# Patient Record
Sex: FEMALE | Race: WHITE
[De-identification: names, ages, dates, MRNs, and addresses within clinical notes are randomized per-mention and may not be internally consistent; named-entity substitution may affect disease eponyms.]

---

## 2017-12-16 ENCOUNTER — HOSPITAL ENCOUNTER (EMERGENCY)
Dept: HOSPITAL 88 - ER | Age: 82
Discharge: HOME | End: 2017-12-16
Payer: MEDICARE

## 2017-12-16 VITALS — WEIGHT: 140 LBS | HEIGHT: 65 IN | BODY MASS INDEX: 23.32 KG/M2

## 2017-12-16 DIAGNOSIS — E11.9: ICD-10-CM

## 2017-12-16 DIAGNOSIS — I10: ICD-10-CM

## 2017-12-16 DIAGNOSIS — M25.511: Primary | ICD-10-CM

## 2017-12-16 DIAGNOSIS — M13.811: ICD-10-CM

## 2017-12-16 PROCEDURE — 99283 EMERGENCY DEPT VISIT LOW MDM: CPT

## 2017-12-16 NOTE — DIAGNOSTIC IMAGING REPORT
Right shoulder - 2 views



HISTORY:  Pain.

COMPARISON: None available.

     

FINDINGS:

Bones:

No acute displaced fracture.

No expansile lytic or sclerotic lesion.



Joints:

The joint spaces are well-maintained.  No dislocation. Degenerative changes are

present in the right acromioclavicular joint.



Soft tissues:

The soft tissues appear unremarkable.



IMPRESSION: 

No acute radiographic abnormality.



Signed by: Dr. Paulino Milton M.D. on 12/16/2017 2:27 PM

## 2018-01-20 ENCOUNTER — HOSPITAL ENCOUNTER (OUTPATIENT)
Dept: HOSPITAL 88 - ER | Age: 83
Setting detail: OBSERVATION
LOS: 1 days | Discharge: HOME | End: 2018-01-21
Attending: INTERNAL MEDICINE | Admitting: INTERNAL MEDICINE
Payer: MEDICARE

## 2018-01-20 VITALS — SYSTOLIC BLOOD PRESSURE: 128 MMHG | DIASTOLIC BLOOD PRESSURE: 59 MMHG

## 2018-01-20 VITALS — SYSTOLIC BLOOD PRESSURE: 137 MMHG | DIASTOLIC BLOOD PRESSURE: 80 MMHG

## 2018-01-20 VITALS — WEIGHT: 136.04 LBS | BODY MASS INDEX: 22.67 KG/M2 | HEIGHT: 65 IN

## 2018-01-20 DIAGNOSIS — I10: ICD-10-CM

## 2018-01-20 DIAGNOSIS — I34.0: ICD-10-CM

## 2018-01-20 DIAGNOSIS — I07.1: ICD-10-CM
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
## 2018-01-20 DIAGNOSIS — S00.93XA: Primary | ICD-10-CM

## 2018-01-20 DIAGNOSIS — I35.0: ICD-10-CM

## 2018-01-20 DIAGNOSIS — E03.9: ICD-10-CM
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
## 2018-01-20 DIAGNOSIS — M19.90: ICD-10-CM

## 2018-01-20 DIAGNOSIS — R55: ICD-10-CM

## 2018-01-20 DIAGNOSIS — E11.9: ICD-10-CM

## 2018-01-20 LAB
ALBUMIN SERPL-MCNC: 3.9 G/DL (ref 3.5–5)
ALBUMIN/GLOB SERPL: 1.2 {RATIO} (ref 0.8–2)
ALP SERPL-CCNC: 64 IU/L (ref 40–150)
ALT SERPL-CCNC: 18 IU/L (ref 0–55)
ANION GAP SERPL CALC-SCNC: 13.9 MMOL/L (ref 8–16)
BACTERIA URNS QL MICRO: (no result) /HPF
BASOPHILS # BLD AUTO: 0 10*3/UL (ref 0–0.1)
BASOPHILS NFR BLD AUTO: 0.4 % (ref 0–1)
BILIRUB UR QL: NEGATIVE
BUN SERPL-MCNC: 18 MG/DL (ref 7–26)
BUN/CREAT SERPL: 23 (ref 6–25)
CALCIUM SERPL-MCNC: 9.7 MG/DL (ref 8.4–10.2)
CHLORIDE SERPL-SCNC: 100 MMOL/L (ref 98–107)
CK MB SERPL-MCNC: 2 NG/ML (ref 0–5)
CK MB SERPL-MCNC: 2.1 NG/ML (ref 0–5)
CK SERPL-CCNC: 52 IU/L (ref 29–168)
CK SERPL-CCNC: 58 IU/L (ref 29–168)
CLARITY UR: (no result)
CO2 SERPL-SCNC: 24 MMOL/L (ref 22–29)
COLOR UR: YELLOW
DEPRECATED APTT PLAS QN: 29.7 SECONDS (ref 23.8–35.5)
DEPRECATED INR PLAS: 0.81
DEPRECATED NEUTROPHILS # BLD AUTO: 7.1 10*3/UL (ref 2.1–6.9)
DEPRECATED RBC URNS MANUAL-ACNC: (no result) /HPF (ref 0–5)
EGFRCR SERPLBLD CKD-EPI 2021: > 60 ML/MIN (ref 60–?)
EOSINOPHIL # BLD AUTO: 0.1 10*3/UL (ref 0–0.4)
EOSINOPHIL NFR BLD AUTO: 0.8 % (ref 0–6)
EPI CELLS URNS QL MICRO: (no result) /LPF
ERYTHROCYTE [DISTWIDTH] IN CORD BLOOD: 13.2 % (ref 11.7–14.4)
GLOBULIN PLAS-MCNC: 3.3 G/DL (ref 2.3–3.5)
GLUCOSE SERPLBLD-MCNC: 93 MG/DL (ref 74–118)
HCT VFR BLD AUTO: 46.5 % (ref 34.2–44.1)
HGB BLD-MCNC: 15.7 G/DL (ref 12–16)
KETONES UR QL STRIP.AUTO: (no result)
LEUKOCYTE ESTERASE UR QL STRIP.AUTO: (no result)
LIPASE SERPL-CCNC: 49 U/L (ref 8–78)
LYMPHOCYTES # BLD: 1.4 10*3/UL (ref 1–3.2)
LYMPHOCYTES NFR BLD AUTO: 15.1 % (ref 18–39.1)
MCH RBC QN AUTO: 31.1 PG (ref 28–32)
MCHC RBC AUTO-ENTMCNC: 33.8 G/DL (ref 31–35)
MCV RBC AUTO: 92.1 FL (ref 81–99)
MONOCYTES # BLD AUTO: 0.7 10*3/UL (ref 0.2–0.8)
MONOCYTES NFR BLD AUTO: 7.6 % (ref 4.4–11.3)
NEUTS SEG NFR BLD AUTO: 75 % (ref 38.7–80)
NITRITE UR QL STRIP.AUTO: NEGATIVE
PLATELET # BLD AUTO: 295 X10E3/UL (ref 140–360)
POTASSIUM SERPL-SCNC: 3.9 MMOL/L (ref 3.5–5.1)
PROT UR QL STRIP.AUTO: NEGATIVE
PROTHROMBIN TIME: 11.6 SECONDS (ref 11.9–14.5)
RBC # BLD AUTO: 5.05 X10E6/UL (ref 3.6–5.1)
SODIUM SERPL-SCNC: 134 MMOL/L (ref 136–145)
SP GR UR STRIP: 1.01 (ref 1.01–1.02)
UROBILINOGEN UR STRIP-MCNC: 0.2 MG/DL (ref 0.2–1)
WBC #/AREA URNS HPF: (no result) /HPF (ref 0–5)

## 2018-01-20 PROCEDURE — 82948 REAGENT STRIP/BLOOD GLUCOSE: CPT

## 2018-01-20 PROCEDURE — 82550 ASSAY OF CK (CPK): CPT

## 2018-01-20 PROCEDURE — 83690 ASSAY OF LIPASE: CPT

## 2018-01-20 PROCEDURE — 71045 X-RAY EXAM CHEST 1 VIEW: CPT

## 2018-01-20 PROCEDURE — 93306 TTE W/DOPPLER COMPLETE: CPT

## 2018-01-20 PROCEDURE — 72125 CT NECK SPINE W/O DYE: CPT

## 2018-01-20 PROCEDURE — 71010: CPT

## 2018-01-20 PROCEDURE — 82553 CREATINE MB FRACTION: CPT

## 2018-01-20 PROCEDURE — 84484 ASSAY OF TROPONIN QUANT: CPT

## 2018-01-20 PROCEDURE — 81001 URINALYSIS AUTO W/SCOPE: CPT

## 2018-01-20 PROCEDURE — 80053 COMPREHEN METABOLIC PANEL: CPT

## 2018-01-20 PROCEDURE — 99284 EMERGENCY DEPT VISIT MOD MDM: CPT

## 2018-01-20 PROCEDURE — 85025 COMPLETE CBC W/AUTO DIFF WBC: CPT

## 2018-01-20 PROCEDURE — 93005 ELECTROCARDIOGRAM TRACING: CPT

## 2018-01-20 PROCEDURE — 87086 URINE CULTURE/COLONY COUNT: CPT

## 2018-01-20 PROCEDURE — 36415 COLL VENOUS BLD VENIPUNCTURE: CPT

## 2018-01-20 PROCEDURE — 70450 CT HEAD/BRAIN W/O DYE: CPT

## 2018-01-20 PROCEDURE — 85730 THROMBOPLASTIN TIME PARTIAL: CPT

## 2018-01-20 PROCEDURE — 85610 PROTHROMBIN TIME: CPT

## 2018-01-20 RX ADMIN — ACETAMINOPHEN AND CODEINE PHOSPHATE ONE EA: 300; 30 TABLET ORAL at 14:31

## 2018-01-20 RX ADMIN — ACETAMINOPHEN AND CODEINE PHOSPHATE ONE EA: 300; 30 TABLET ORAL at 13:23

## 2018-01-20 NOTE — DIAGNOSTIC IMAGING REPORT
EXAMINATION: Chest,  CHEST SINGLE (PORTABLE)    



INDICATION: Chest pain



COMPARISON:  Portable chest 6/6/2017

     

FINDINGS:    



LINES:  None.



Heart:  Normal cardiac silhouette.



Vascular: The pulmonary vasculature is within normal limits.  Atherosclerotic

calcifications of the aortic arch.



Mediastinum: No mediastinal, hilar, or axillary mass or lymphadenopathy.



Lungs: No parenchymal mass.  No focal consolidation. Bibasilar atelectasis.



Pleura:  No pleural effusion.  No pneumothorax.



Bones: No acute osseous abnormality.  Degenerative changes of the thoracic

spine.



Soft tissues:  Normal.



Impression: 

No acute radiographic abnormality.



Signed by: Dr. Paulino Milton M.D. on 1/20/2018 2:18 PM

## 2018-01-20 NOTE — DIAGNOSTIC IMAGING REPORT
Exams: Head and cervical spine CTs without IV contrast

History: Trauma

Comparison studies: Multiple prior head CTs which date to 8/21/2014, most

recent head CT of 12/7/2016. Brain MRI 8/19/2060.



Technique:

Axial images were obtained from the brain and cervical spine.

Coronal and sagittal images reconstructed from the axial data.

Intravenous contrast: None



Findings:



Head CT:



Scalp: Small left parietal scalp hematoma.

Bones: No fractures, blastic or lytic lesions.



Extra-axial spaces: No masses.  No fluid collections.



Brain sulci: Mildly prominent.

Ventricles: Mild compensatory dilatation. No hydrocephalus.



Parenchyma: 

No mass, acute hemorrhage or acute cortical vascular insults. A few scattered

hypodensities in the supratentorial white matter are nonspecific but most

compatible with chronic small vessel ischemic changes.



Sellar/suprasellar region: No abnormalities.

Craniocervical junction: The foramen magnum is patent.  No Chiari one

malformation.



Cervical spine CT:



Fractures: None.

Soft tissues: No gross abnormalities.



Atlantoaxial articulation: Intact with advanced degenerative changes.



Alignment: Straightened cervical curvature may be positional. Mild

cervicothoracic curvature convex to the left. No subluxations.



Cervicomedullary junction: No abnormalities. The foramen magnum is patent.



Vertebrae: 

Bones are demineralized.

No infection or neoplasm.



Degenerative changes: 

Moderate degenerative changes at C1-C2 with narrowing of the atlantodental

articulation, enthesophyte formation and ligamentous calcification superior and

posteriorly to the dens.



Prominent anterior osteophytes from C4 to C7 indent the right prevertebral soft

tissues. Multilevel cervical disc degeneration, worse/severe at C5-C6 and at

C6-C7 and moderate at C7-T1. The C5-C6 disc space is partially fused. Disc

osteophyte complexes from C2 to T1 indent the thecal sac but do not result in

significant canal stenosis. Multilevel moderate facet arthrosis. Multilevel

foraminal stenosis due to uncovertebral and facet arthrosis (mild right at

C2-C3, moderate right and mild left at C3-C4, mild right at C4-C5 and moderate

on the right and mild on the left at C5-C6 and at C6-C7)



Incidental findings:

Atherosclerotic calcifications in the cervical carotid bifurcation/carotid

bulbs, carotid siphons and intradural vertebral arteries. Bilateral lens

replacements related to previous cataract surgery.



IMPRESSION:



Head CT:

1.  Small left parietal scalp hematoma without underlying fracture.

2.  No acute intracranial abnormalities.

3.  No other changes from the previous head CT of 12/7/2016.

4.  Mild generalized volume loss and mild chronic microvascular ischemic

changes.



Cervical spine CT:

1.  No cervical spine fracture or subluxation.

2.  Multilevel degenerative changes as described.

3.  Cannot exclude ligament, spinal cord and or vascular abnormalities on the

basis of this examination.



Signed by: Dr. Los Mireles M.D. on 1/20/2018 2:27 PM

## 2018-01-21 VITALS — SYSTOLIC BLOOD PRESSURE: 166 MMHG | DIASTOLIC BLOOD PRESSURE: 71 MMHG

## 2018-01-21 VITALS — DIASTOLIC BLOOD PRESSURE: 82 MMHG | SYSTOLIC BLOOD PRESSURE: 150 MMHG

## 2018-01-21 VITALS — SYSTOLIC BLOOD PRESSURE: 162 MMHG | DIASTOLIC BLOOD PRESSURE: 74 MMHG

## 2018-01-21 LAB
CK MB SERPL-MCNC: 1.1 NG/ML (ref 0–5)
CK SERPL-CCNC: 41 IU/L (ref 29–168)

## 2018-01-21 RX ADMIN — LEVOTHYROXINE SODIUM SCH MCG: 88 TABLET ORAL at 05:20

## 2018-01-21 RX ADMIN — LEVOTHYROXINE SODIUM SCH MCG: 88 TABLET ORAL at 08:04

## 2018-02-10 NOTE — DISCHARGE SUMMARY
DISCHARGE DIAGNOSES

1. Status post fall stepping off a curb.

2. Hypertension.  

3. Hypothyroidism.  



HISTORY OF PRESENT ILLNESS AND HOSPITAL COURSE:  See the hospital chart for 

full details.  Patient an 87-year-old lady.  While stepping off of a curb, 

she fell.  She did not have syncope, chest pain, or shortness of breath, so 

she was brought in for further evaluation.  Vital signs were stable.  Labs 

were stable.  She was able to ambulate with physical therapy the next 

morning without troubles.  Talking with the patient, I did offer her 

skilled nursing facility for rehab since she lives at home by herself, but 

she refused that idea and went home with her daughter to follow up in 1 to 

2 weeks with me.  Please see hospital chart for full details.  



 



 _________________________________

ROLAND DANIELS MD



DD:  02/10/2018 08:10

DT:  02/10/2018 20:18

Job#:  [id number]

## 2018-12-12 ENCOUNTER — HOSPITAL ENCOUNTER (INPATIENT)
Dept: HOSPITAL 88 - ER | Age: 83
LOS: 5 days | Discharge: NURSING, REHAB OR LONG-TERM CARE | DRG: 689 | End: 2018-12-17
Attending: INTERNAL MEDICINE | Admitting: INTERNAL MEDICINE
Payer: MEDICARE

## 2018-12-12 VITALS — HEIGHT: 66 IN | WEIGHT: 153.31 LBS | BODY MASS INDEX: 24.64 KG/M2

## 2018-12-12 VITALS — DIASTOLIC BLOOD PRESSURE: 65 MMHG | SYSTOLIC BLOOD PRESSURE: 150 MMHG

## 2018-12-12 DIAGNOSIS — N39.0: Primary | ICD-10-CM

## 2018-12-12 DIAGNOSIS — Z79.52: ICD-10-CM

## 2018-12-12 DIAGNOSIS — R09.89: ICD-10-CM

## 2018-12-12 DIAGNOSIS — G93.41: ICD-10-CM
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
## 2018-12-12 DIAGNOSIS — E11.9: ICD-10-CM

## 2018-12-12 DIAGNOSIS — E03.9: ICD-10-CM

## 2018-12-12 DIAGNOSIS — Z79.4: ICD-10-CM

## 2018-12-12 DIAGNOSIS — I10: ICD-10-CM

## 2018-12-12 DIAGNOSIS — E78.5: ICD-10-CM

## 2018-12-12 LAB
ALBUMIN SERPL-MCNC: 3.2 G/DL (ref 3.5–5)
ALBUMIN/GLOB SERPL: 0.8 {RATIO} (ref 0.8–2)
ALP SERPL-CCNC: 77 IU/L (ref 40–150)
ALT SERPL-CCNC: 23 IU/L (ref 0–55)
ANION GAP SERPL CALC-SCNC: 16.4 MMOL/L (ref 8–16)
BACTERIA URNS QL MICRO: (no result) /HPF
BASOPHILS # BLD AUTO: 0 10*3/UL (ref 0–0.1)
BASOPHILS NFR BLD AUTO: 0.3 % (ref 0–1)
BILIRUB UR QL: NEGATIVE
BUN SERPL-MCNC: 17 MG/DL (ref 7–26)
BUN/CREAT SERPL: 22 (ref 6–25)
CALCIUM SERPL-MCNC: 9.5 MG/DL (ref 8.4–10.2)
CHLORIDE SERPL-SCNC: 102 MMOL/L (ref 98–107)
CK MB SERPL-MCNC: 3 NG/ML (ref 0–5)
CK SERPL-CCNC: 202 IU/L (ref 29–168)
CLARITY UR: CLEAR
CO2 SERPL-SCNC: 19 MMOL/L (ref 22–29)
COLOR UR: YELLOW
DEPRECATED APTT PLAS QN: 31.6 SECONDS (ref 23.8–35.5)
DEPRECATED INR PLAS: 0.87
DEPRECATED NEUTROPHILS # BLD AUTO: 6.4 10*3/UL (ref 2.1–6.9)
DEPRECATED RBC URNS MANUAL-ACNC: (no result) /HPF (ref 0–5)
EGFRCR SERPLBLD CKD-EPI 2021: > 60 ML/MIN (ref 60–?)
EOSINOPHIL # BLD AUTO: 0.1 10*3/UL (ref 0–0.4)
EOSINOPHIL NFR BLD AUTO: 1.3 % (ref 0–6)
EPI CELLS URNS QL MICRO: (no result) /LPF
ERYTHROCYTE [DISTWIDTH] IN CORD BLOOD: 13.2 % (ref 11.7–14.4)
GLOBULIN PLAS-MCNC: 3.9 G/DL (ref 2.3–3.5)
GLUCOSE SERPLBLD-MCNC: 144 MG/DL (ref 74–118)
HCT VFR BLD AUTO: 43.6 % (ref 34.2–44.1)
HGB BLD-MCNC: 14.5 G/DL (ref 12–16)
KETONES UR QL STRIP.AUTO: NEGATIVE
LEUKOCYTE ESTERASE UR QL STRIP.AUTO: NEGATIVE
LYMPHOCYTES # BLD: 1.6 10*3/UL (ref 1–3.2)
LYMPHOCYTES NFR BLD AUTO: 16.7 % (ref 18–39.1)
MAGNESIUM SERPL-MCNC: 2.1 MG/DL (ref 1.3–2.1)
MCH RBC QN AUTO: 30.9 PG (ref 28–32)
MCHC RBC AUTO-ENTMCNC: 33.3 G/DL (ref 31–35)
MCV RBC AUTO: 93 FL (ref 81–99)
MONOCYTES # BLD AUTO: 1.3 10*3/UL (ref 0.2–0.8)
MONOCYTES NFR BLD AUTO: 13.7 % (ref 4.4–11.3)
MUCOUS THREADS URNS QL MICRO: (no result)
NEUTS SEG NFR BLD AUTO: 67.5 % (ref 38.7–80)
NITRITE UR QL STRIP.AUTO: NEGATIVE
PLATELET # BLD AUTO: 252 X10E3/UL (ref 140–360)
POTASSIUM SERPL-SCNC: 3.4 MMOL/L (ref 3.5–5.1)
PROT UR QL STRIP.AUTO: NEGATIVE
PROTHROMBIN TIME: 12.7 SECONDS (ref 11.9–14.5)
RBC # BLD AUTO: 4.69 X10E6/UL (ref 3.6–5.1)
SODIUM SERPL-SCNC: 134 MMOL/L (ref 136–145)
SP GR UR STRIP: 1.02 (ref 1.01–1.02)
UROBILINOGEN UR STRIP-MCNC: 0.2 MG/DL (ref 0.2–1)
WBC #/AREA URNS HPF: (no result) /HPF (ref 0–5)

## 2018-12-12 PROCEDURE — 84484 ASSAY OF TROPONIN QUANT: CPT

## 2018-12-12 PROCEDURE — 80048 BASIC METABOLIC PNL TOTAL CA: CPT

## 2018-12-12 PROCEDURE — 82550 ASSAY OF CK (CPK): CPT

## 2018-12-12 PROCEDURE — 71046 X-RAY EXAM CHEST 2 VIEWS: CPT

## 2018-12-12 PROCEDURE — 85025 COMPLETE CBC W/AUTO DIFF WBC: CPT

## 2018-12-12 PROCEDURE — 83735 ASSAY OF MAGNESIUM: CPT

## 2018-12-12 PROCEDURE — 87086 URINE CULTURE/COLONY COUNT: CPT

## 2018-12-12 PROCEDURE — 81001 URINALYSIS AUTO W/SCOPE: CPT

## 2018-12-12 PROCEDURE — 85610 PROTHROMBIN TIME: CPT

## 2018-12-12 PROCEDURE — 87040 BLOOD CULTURE FOR BACTERIA: CPT

## 2018-12-12 PROCEDURE — 85730 THROMBOPLASTIN TIME PARTIAL: CPT

## 2018-12-12 PROCEDURE — 80053 COMPREHEN METABOLIC PANEL: CPT

## 2018-12-12 PROCEDURE — 93880 EXTRACRANIAL BILAT STUDY: CPT

## 2018-12-12 PROCEDURE — 51700 IRRIGATION OF BLADDER: CPT

## 2018-12-12 PROCEDURE — 36415 COLL VENOUS BLD VENIPUNCTURE: CPT

## 2018-12-12 PROCEDURE — 83605 ASSAY OF LACTIC ACID: CPT

## 2018-12-12 PROCEDURE — 99284 EMERGENCY DEPT VISIT MOD MDM: CPT

## 2018-12-12 PROCEDURE — 93005 ELECTROCARDIOGRAM TRACING: CPT

## 2018-12-12 PROCEDURE — 70450 CT HEAD/BRAIN W/O DYE: CPT

## 2018-12-12 PROCEDURE — 71045 X-RAY EXAM CHEST 1 VIEW: CPT

## 2018-12-12 PROCEDURE — 82948 REAGENT STRIP/BLOOD GLUCOSE: CPT

## 2018-12-12 PROCEDURE — 82553 CREATINE MB FRACTION: CPT

## 2018-12-12 RX ADMIN — CEFTRIAXONE SCH MLS/HR: 100 INJECTION, POWDER, FOR SOLUTION INTRAVENOUS at 22:21

## 2018-12-12 NOTE — XMS REPORT
Patient Summary Document

                             Created on: 2018



KENNY ZAMORA

External Reference #: [id number]

: 1930

Sex: Female



Demographics







                          Address                   [address]

[place]  [address]

 

                          Home Phone                [phone]

 

                          Preferred Language        Unknown

 

                          Marital Status            Unknown

 

                          Judaism Affiliation     Unknown

 

                          Race                      Unknown

 

                                        Additional Race(s)  

 

                          Ethnic Group              Unknown





Author







                          Author                    [facility]

 

                          Address                   Unknown

 

                          Phone                     Unavailable







Care Team Providers







                    Care Team Member Name    Role                Phone

 

                    DELLA JERNIGAN       Unavailable         Unavailable

 

                    MAURO LEVY    Unavailable         Unavailable







Problems

This patient has no known problems.



Allergies, Adverse Reactions, Alerts

This patient has no known allergies or adverse reactions.



Medications

This patient has no known medications.



Results







           Test Description    Test Time    Test Comments    Text Results    Atomic Results    Result

 Comments

 

                CHEST SINGLE (PORTABLE)                                        [facility]   [address] [address]      Patient Name: 
KENNY ZAMORA   MR #: [id number]    : 1930 Age/Sex: 87/F  Acct #: 
[id number] Req #: [id number]  Adm Physician:     Ordered by: DANIELA MONTIEL 
NP  Report #: [id number]   Location: ER  Room/Bed:     
__________________________________________________________________________
_________________________    Procedure: [id number] DX/CHEST SINGLE (PORTABLE)  
Exam Date:                             Exam Time:        REPORT STATUS: Signed  
 EXAMINATION: Chest,  CHEST SINGLE (PORTABLE)          INDICATION: Chest pain   
  COMPARISON:  Portable chest 2017           FINDINGS:          LINES:  
None.      Heart:  Normal cardiac silhouette.      Vascular: The pulmonary 
vasculature is within normal limits.  Atherosclerotic   calcifications of the 
aortic arch.      Mediastinum: No mediastinal, hilar, or axillary mass or 
lymphadenopathy.      Lungs: No parenchymal mass.  No focal consolidation. 
Bibasilar atelectasis.      Pleura:  No pleural effusion.  No pneumothorax.     
Bones: No acute osseous abnormality.  Degenerative changes of the thoracic   
spine.      Soft tissues:  Normal.      Impression:    No acute radiographic 
abnormality.      Signed by: Dr. Burak Fernandez M.D. on 2018 2:18 PM        
Dictated By: BURAK FERNANDEZ MD  Electronically Signed By: BURAK FERNANDEZ MD on   Transcribed By: STACEY on 18       COPY TO:   
DANIELA MONTIEL NP                       

 

                CT BRAIN WO                                         [address] [address]      Patient Name: KENNY ZAMORA   MR 
#: [id number]    : 1930 Age/Sex: 87/F  Acct #: [id number] Req #: [id number]
[id number]  Adm Physician:     Ordered by: DANIELA MONTIEL NP  Report #: [id number]
  Location: ER  Room/Bed:     
__________________________________________________________________________
_________________________    Procedure: [id number] CT/CT BRAIN WO  Exam Date:    
                        Exam Time:        REPORT STATUS: Signed    Exams: Head 
and cervical spine CTs without IV contrast   History: Trauma   Comparison 
studies: Multiple prior head CTs which date to 2014, most   recent head CT 
of 2016. Brain MRI 2060.      Technique:   Axial images were obtained 
from the brain and cervical spine.   Coronal and sagittal images reconstructed 
from the axial data.   Intravenous contrast: None      Findings:      Head CT:  
   Scalp: Small left parietal scalp hematoma.   Bones: No fractures, blastic or 
lytic lesions.      Extra-axial spaces: No masses.  No fluid collections.      
Brain sulci: Mildly prominent.   Ventricles: Mild compensatory dilatation. No 
hydrocephalus.      Parenchyma:    No mass, acute hemorrhage or acute cortical 
vascular insults. A few scattered   hypodensities in the supratentorial white 
matter are nonspecific but most   compatible with chronic small vessel ischemic 
changes.      Sellar/suprasellar region: No abnormalities.   Craniocervical 
junction: The foramen magnum is patent.  No Chiari one   malformation.      
Cervical spine CT:      Fractures: None.   Soft tissues: No gross abnormalities.
     Atlantoaxial articulation: Intact with advanced degenerative changes.      
Alignment: Straightened cervical curvature may be positional. Mild   cerv
icothoracic curvature convex to the left. No subluxations.      Cervicomedullary
junction: No abnormalities. The foramen magnum is patent.      Vertebrae:    
Bones are demineralized.   No infection or neoplasm.      Degenerative changes: 
  Moderate degenerative changes at C1-C2 with narrowing of the atlantodental   
articulation, enthesophyte formation and ligamentous calcification superior and 
 posteriorly to the dens.      Prominent anterior osteophytes from C4 to C7 
indent the right prevertebral soft   tissues. Multilevel cervical disc 
degeneration, worse/severe at C5-C6 and at   C6-C7 and moderate at C7-T1. The 
C5-C6 disc space is partially fused. Disc   osteophyte complexes from C2 to T1 
indent the thecal sac but do not result in   significant canal stenosis. 
Multilevel moderate facet arthrosis. Multilevel   foraminal stenosis due to 
uncovertebral and facet arthrosis (mild right at   C2-C3, moderate right and 
mild left at C3-C4, mild right at C4-C5 and moderate   on the right and mild on 
the left at C5-C6 and at C6-C7)      Incidental findings:   Atherosclerotic 
calcifications in the cervical carotid bifurcation/carotid   bulbs, carotid 
siphons and intradural vertebral arteries. Bilateral lens   replacements related
to previous cataract surgery.      IMPRESSION:      Head CT:   1.  Small left 
parietal scalp hematoma without underlying fracture.   2.  No acute intracranial
abnormalities.   3.  No other changes from the previous head CT of 2016.   
4.  Mild generalized volume loss and mild chronic microvascular ischemic   
changes.      Cervical spine CT:   1.  No cervical spine fracture or 
subluxation.   2.  Multilevel degenerative changes as described.   3.  Cannot 
exclude ligament, spinal cord and or vascular abnormalities on the   basis of 
this examination.      Signed by: Dr. Holger Mireles M.D. on 2018 2:27 PM  
     Dictated By: HOLGER MIRELES MD  Electronically Signed By: HOLGER MIRELES MD on 18  Transcribed By: STACEY on 18       COPY TO:   
DANIELA MONTIEL NP                       

 

                CT CERVICAL SPINE [place] [address]      Patient Name: KENNY ZAMORA   MR #: [id number]    : 1930 Age/Sex: 87/F  Acct #: [id number] Req 
#: [id number]  Adm Physician:     Ordered by: DANIELA MONTIEL NP  Report #: 
[id number]   Location: ER  Room/Bed:     
__________________________________________________________________________
_________________________    Procedure: [id number] CT/CT CERVICAL SPINE WO  Exam 
Date:                             Exam Time:        REPORT STATUS: Signed    
Exams: Head and cervical spine CTs without IV contrast   History: Trauma   
Comparison studies: Multiple prior head CTs which date to 2014, most   
recent head CT of 2016. Brain MRI 2060.      Technique:   Axial images
were obtained from the brain and cervical spine.   Coronal and sagittal images 
reconstructed from the axial data.   Intravenous contrast: None      Findings:  
   Head CT:      Scalp: Small left parietal scalp hematoma.   Bones: No 
fractures, blastic or lytic lesions.      Extra-axial spaces: No masses.  No 
fluid collections.      Brain sulci: Mildly prominent.   Ventricles: Mild 
compensatory dilatation. No hydrocephalus.      Parenchyma:    No mass, acute 
hemorrhage or acute cortical vascular insults. A few scattered   hypodensities 
in the supratentorial white matter are nonspecific but most   compatible with c
hronic small vessel ischemic changes.      Sellar/suprasellar region: No 
abnormalities.   Craniocervical junction: The foramen magnum is patent.  No 
Chiari one   malformation.      Cervical spine CT:      Fractures: None.   Soft 
tissues: No gross abnormalities.      Atlantoaxial articulation: Intact with 
advanced degenerative changes.      Alignment: Straightened cervical curvature 
may be positional. Mild   cervicothoracic curvature convex to the left. No 
subluxations.      Cervicomedullary junction: No abnormalities. The foramen 
magnum is patent.      Vertebrae:    Bones are demineralized.   No infection or 
neoplasm.      Degenerative changes:    Moderate degenerative changes at C1-C2 
with narrowing of the atlantodental   articulation, enthesophyte formation and 
ligamentous calcification superior and   posteriorly to the dens.      Prominent
anterior osteophytes from C4 to C7 indent the right prevertebral soft   tissues.
Multilevel cervical disc degeneration, worse/severe at C5-C6 and at   C6-C7 and 
moderate at C7-T1. The C5-C6 disc space is partially fused. Disc   osteophyte 
complexes from C2 to T1 indent the thecal sac but do not result in   significant
canal stenosis. Multilevel moderate facet arthrosis. Multilevel   foraminal 
stenosis due to uncovertebral and facet arthrosis (mild right at   C2-C3, 
moderate right and mild left at C3-C4, mild right at C4-C5 and moderate   on the
right and mild on the left at C5-C6 and at C6-C7)      Incidental findings:   
Atherosclerotic calcifications in the cervical carotid bifurcation/carotid   
bulbs, carotid siphons and intradural vertebral arteries. Bilateral lens   
replacements related to previous cataract surgery.      IMPRESSION:      Head 
CT:   1.  Small left parietal scalp hematoma without underlying fracture.   2.  
No acute intracranial abnormalities.   3.  No other changes from the previous 
head CT of 2016.   4.  Mild generalized volume loss and mild chronic 
microvascular ischemic   changes.      Cervical spine CT:   1.  No cervical 
spine fracture or subluxation.   2.  Multilevel degenerative changes as 
described.   3.  Cannot exclude ligament, spinal cord and or vascular 
abnormalities on the   basis of this examination.      Signed by: Dr. Holger Mireles M.D. on 2018 2:27 PM        Dictated By: HOLGER MIRELES MD  
Electronically Signed By: HOLGER MIRELES MD on 18  Transcribed By: 
STACEY on 18       COPY TO:   DANIELA MONTIEL NP             

 

                SHOULDER RIGHT COMPLETE                                        [place] [address]      Patient Name: 
KENNY ZAMORA   MR #: [id number]    : 1930 Age/Sex: 87/F  Acct #: 
[id number] Req #: [id number]  Adm Physician:     Ordered by: RAMIREZ JERNIGAN MD 
Report #: [id number]   Location:   Room/Bed:     
_____________________________________________________________________________
______________________    Procedure: [id number] DX/SHOULDER RIGHT COMPLETE  Exam 
Date: 17                            Exam Time: 1405       REPORT STATUS: 
Signed    Right shoulder - 2 views      HISTORY:  Pain.   COMPARISON: None 
available.           FINDINGS:   Bones:   No acute displaced fracture.   No 
expansile lytic or sclerotic lesion.      Joints:   The joint spaces are well-
maintained.  No dislocation. Degenerative changes are   present in the right 
acromioclavicular joint.      Soft tissues:   The soft tissues appear 
unremarkable.      IMPRESSION:    No acute radiographic abnormality.      Signed
by: Dr. Burak Fernandez M.D. on 2017 2:27 PM        Dictated By: BURAK FERNANDEZ MD  Electronically Signed By: BURAK FERNANDEZ MD on 17  Transcribed By: 
STACEY on 17       COPY TO:   RAMIREZ JERNIGAN MD             

 

                CHEST SINGLE (PORTABLE)                                        [place] [address]      Patient Name: 
KENNY ZAMORA   MR #: [id number]    : 1930 Age/Sex: 87/F  Acct #: 
[id number] Req #: [id number]  Adm Physician:     Ordered by: JAQUAN LEVY MD  Report #: [id number]   Location: ER  Room/Bed:     
_____________________________________________________________________
______________________________    Procedure: [id number] DX/CHEST SINGLE 
(PORTABLE)  Exam Date: 17                            Exam Time: 0540      
REPORT STATUS: Signed    EXAMINATION:  CHEST SINGLE (PORTABLE)          
INDICATION:           Shortness of breath       COMPARISON:  2017           
FINDINGS:   TUBES and LINES:  None.      LUNGS:  Lungs are well inflated.       
There is mild prominence of the central   pulmonary vasculature, consistent with
pulmonary venous congestion.      PLEURA:  No pleural effusion or pneumothorax. 
    HEART AND MEDIASTINUM:  The cardiomediastinal silhouette is unremarkable. 
There   are atherosclerotic calcifications within the aorta.      BONES AND SOFT
TISSUES:  No acute osseous lesion.  Soft tissues are   unremarkable.      UPPER 
ABDOMEN: No free air under the diaphragm.          IMPRESSION:    No acute 
thoracic abnormality.         Signed by: Dr. Angelito Katz M.D. on 2017 
6:23 AM        Dictated By: ANGELITO MARTINEZ MD  Electronically Signed By: 
ANGELITO MARTINEZ MD on 17  Transcribed By: STACEY on 17       COPY TO:   JAQUAN LEVY MD

## 2018-12-12 NOTE — DIAGNOSTIC IMAGING REPORT
EXAMINATION: Head CT without contrast.  





HISTORY:Status post fall.



COMPARISON:Multiple prior studies, most recent CT brain from 1/20/2018. 



TECHNIQUE: Multidetector axial images were obtained from the foramen magnum to

the vertex without contrast. The images were reconstructed using brain and bone

algorithms.  Thin section brain images were reformatted into coronal and

sagittal planes.

Dose modulation, iterative reconstruction, and/or weight based adjustment of

the mA/kV was utilized to reduce the radiation dose to as low as reasonably

achievable.



Intravenous contrast: None  



IMAGE QUALITY: Acceptable.



FINDINGS:

    Skull/scalp: No lytic or blastic. lesions.  No surgical changes.

    Parenchyma: Unchanged nonspecific few, scattered supratentorial white

matter patchy hypodensity are likely related to small vessel ischemic changes.

No acute hemorrhage, mass or acute major vascular territorial infarct.

    Arteries: No density suggestive of thrombosis. Atherosclerotic

calcification in bilateral carotid siphon and V4 segment of the vertebral

arteries.   

    Dural sinuses: No abnormal density suggestive of thrombosis. 

    Ventricles: Mild compensated dilatation due to volume loss. No acute

hydrocephalus.

    Extra-axial spaces: No abnormal density.  

    Brain volume: Generalized age-related cerebral volume loss.

    Craniocervical junction: No mass, Chiari malformation, or basilar

invagination.

    Sella: No mass. 

    Paranasal/mastoid sinuses: Imaged portions unremarkable.





IMPRESSION:

No acute intracranial abnormality.

Interval resolution of mild left parietal scalp hematoma. Otherwise, no change

since CT brain from 1/20/2018.



Chronic findings:

1. Mild supratentorial white matter microvascular ischemic changes.

2. Generalized age-related cerebral volume loss.



Signed by: Dr. Mariajose Rai M.D. on 12/12/2018 8:38 PM

## 2018-12-12 NOTE — DIAGNOSTIC IMAGING REPORT
EXAMINATION:  CHEST SINGLE (PORTABLE)    



INDICATION:       

^FALL AMS    



COMPARISON:  Chest radiograph 1/20/2018

     

FINDINGS:  AP view   



TUBES and LINES:  None.



LUNGS:  Lungs are well inflated.  Lungs are clear.   There is no evidence of

pneumonia or pulmonary edema.



PLEURA:  No pleural effusion or pneumothorax.



HEART AND MEDIASTINUM:  The cardiomediastinal silhouette is unremarkable..  



BONES AND SOFT TISSUES: Left-sided ribs are suboptimally evaluated. No acute

osseous lesion.  Soft tissues are unremarkable.



UPPER ABDOMEN: No free air under the diaphragm.    



IMPRESSION: 

No acute thoracic abnormality.





Signed by: Dr. Penelope Helms M.D. on 12/12/2018 8:45 PM

## 2018-12-12 NOTE — NUR
patient recieved to room 294 via stretcher from the er. vss. no c/o pain noted. soft cast 
with sling noted to right arm. small bruise noted to center chest. light bruise and swelling 
noted to bilateral knees. blanchable redness noted to sacrum. garcia catheter patent,

draining clear yellow urine to bsd. admit assessment/history obtained. daughter noted at the 
bedside.

## 2018-12-13 VITALS — SYSTOLIC BLOOD PRESSURE: 151 MMHG | DIASTOLIC BLOOD PRESSURE: 69 MMHG

## 2018-12-13 VITALS — SYSTOLIC BLOOD PRESSURE: 150 MMHG | DIASTOLIC BLOOD PRESSURE: 65 MMHG

## 2018-12-13 VITALS — DIASTOLIC BLOOD PRESSURE: 68 MMHG | SYSTOLIC BLOOD PRESSURE: 152 MMHG

## 2018-12-13 VITALS — SYSTOLIC BLOOD PRESSURE: 157 MMHG | DIASTOLIC BLOOD PRESSURE: 72 MMHG

## 2018-12-13 VITALS — SYSTOLIC BLOOD PRESSURE: 135 MMHG | DIASTOLIC BLOOD PRESSURE: 63 MMHG

## 2018-12-13 VITALS — DIASTOLIC BLOOD PRESSURE: 69 MMHG | SYSTOLIC BLOOD PRESSURE: 151 MMHG

## 2018-12-13 VITALS — SYSTOLIC BLOOD PRESSURE: 146 MMHG | DIASTOLIC BLOOD PRESSURE: 70 MMHG

## 2018-12-13 LAB
ALBUMIN SERPL-MCNC: 2.4 G/DL (ref 3.5–5)
ALBUMIN/GLOB SERPL: 0.8 {RATIO} (ref 0.8–2)
ALP SERPL-CCNC: 58 IU/L (ref 40–150)
ALT SERPL-CCNC: 21 IU/L (ref 0–55)
ANION GAP SERPL CALC-SCNC: 11.7 MMOL/L (ref 8–16)
BASOPHILS # BLD AUTO: 0 10*3/UL (ref 0–0.1)
BASOPHILS NFR BLD AUTO: 0.4 % (ref 0–1)
BUN SERPL-MCNC: 13 MG/DL (ref 7–26)
BUN/CREAT SERPL: 19 (ref 6–25)
CALCIUM SERPL-MCNC: 8.5 MG/DL (ref 8.4–10.2)
CHLORIDE SERPL-SCNC: 109 MMOL/L (ref 98–107)
CK MB SERPL-MCNC: 1.3 NG/ML (ref 0–5)
CK MB SERPL-MCNC: 1.5 NG/ML (ref 0–5)
CK MB SERPL-MCNC: 1.7 NG/ML (ref 0–5)
CK SERPL-CCNC: 116 IU/L (ref 29–168)
CK SERPL-CCNC: 122 IU/L (ref 29–168)
CK SERPL-CCNC: 132 IU/L (ref 29–168)
CO2 SERPL-SCNC: 18 MMOL/L (ref 22–29)
DEPRECATED NEUTROPHILS # BLD AUTO: 4.3 10*3/UL (ref 2.1–6.9)
EGFRCR SERPLBLD CKD-EPI 2021: > 60 ML/MIN (ref 60–?)
EOSINOPHIL # BLD AUTO: 0.1 10*3/UL (ref 0–0.4)
EOSINOPHIL NFR BLD AUTO: 1.8 % (ref 0–6)
ERYTHROCYTE [DISTWIDTH] IN CORD BLOOD: 12.9 % (ref 11.7–14.4)
GLOBULIN PLAS-MCNC: 3.1 G/DL (ref 2.3–3.5)
GLUCOSE SERPLBLD-MCNC: 115 MG/DL (ref 74–118)
HCT VFR BLD AUTO: 37.1 % (ref 34.2–44.1)
HGB BLD-MCNC: 12.3 G/DL (ref 12–16)
LYMPHOCYTES # BLD: 1.8 10*3/UL (ref 1–3.2)
LYMPHOCYTES NFR BLD AUTO: 23.8 % (ref 18–39.1)
MCH RBC QN AUTO: 30.7 PG (ref 28–32)
MCHC RBC AUTO-ENTMCNC: 33.2 G/DL (ref 31–35)
MCV RBC AUTO: 92.5 FL (ref 81–99)
MONOCYTES # BLD AUTO: 1.1 10*3/UL (ref 0.2–0.8)
MONOCYTES NFR BLD AUTO: 15 % (ref 4.4–11.3)
NEUTS SEG NFR BLD AUTO: 58.6 % (ref 38.7–80)
PLATELET # BLD AUTO: 227 X10E3/UL (ref 140–360)
POTASSIUM SERPL-SCNC: 3.7 MMOL/L (ref 3.5–5.1)
RBC # BLD AUTO: 4.01 X10E6/UL (ref 3.6–5.1)
SODIUM SERPL-SCNC: 135 MMOL/L (ref 136–145)

## 2018-12-13 RX ADMIN — INSULIN LISPRO SCH UNIT: 100 INJECTION, SOLUTION INTRAVENOUS; SUBCUTANEOUS at 20:43

## 2018-12-13 RX ADMIN — Medication PRN MG: at 06:21

## 2018-12-13 RX ADMIN — INSULIN LISPRO SCH UNIT: 100 INJECTION, SOLUTION INTRAVENOUS; SUBCUTANEOUS at 07:30

## 2018-12-13 RX ADMIN — CEFTRIAXONE SCH MLS/HR: 100 INJECTION, POWDER, FOR SOLUTION INTRAVENOUS at 21:45

## 2018-12-13 RX ADMIN — INSULIN LISPRO SCH UNIT: 100 INJECTION, SOLUTION INTRAVENOUS; SUBCUTANEOUS at 11:30

## 2018-12-13 RX ADMIN — METOPROLOL SUCCINATE SCH MG: 25 TABLET, EXTENDED RELEASE ORAL at 20:42

## 2018-12-13 RX ADMIN — BICTEGRAVIR SODIUM, EMTRICITABINE, AND TENOFOVIR ALAFENAMIDE FUMARATE SCH: 50; 200; 25 TABLET ORAL at 17:00

## 2018-12-13 RX ADMIN — INSULIN LISPRO SCH UNIT: 100 INJECTION, SOLUTION INTRAVENOUS; SUBCUTANEOUS at 16:40

## 2018-12-13 NOTE — DIAGNOSTIC IMAGING REPORT
Exam:  Right humerus 2 views



History:  Right elbow fracture



Comparison: None.



Findings: The distal humerus is suboptimally evaluated secondary to elbow

flexion. No displaced fracture or dislocation of the proximal humerus.

Degenerative changes of the acromioclavicular and glenohumeral joint.



Suspected cortical step-off along the olecranon process of the ulna is

partially visualized.



Overlying cast material limits evaluation of the elbow joint.



Impression:



Suspected minimally displaced fracture of the olecranon process of the ulna is

partially visualized. Dedicated elbow radiographs are suggested for further

evaluation.



No displaced proximal humeral fracture.





Signed by: Dr. Los Escamilla M.D. on 12/13/2018 11:09 AM

## 2018-12-13 NOTE — DIAGNOSTIC IMAGING REPORT
EXAMINATION: PA and lateral views of the chest.



COMPARISON: Single view chest 12/12/2018



CLINICAL HISTORY:  Status post fall

     

DISCUSSION:



The lungs remain well-inflated. No focal airspace consolidation, pleural

effusion, or pneumothorax. Mediastinal contour and pulmonary vasculature are

notable for tortuous thoracic aorta with atherosclerotic calcification. Normal

heart size. No pulmonary edema.



No acute osseous abnormalities. Bilateral high riding humeral heads suggestive

of chronic rotator cuff tears.



IMPRESSION: 

No acute cardiopulmonary abnormality.











Signed by: Dr. Los Escamilla M.D. on 12/13/2018 11:06 AM

## 2018-12-13 NOTE — NUR
patient recieved awake, alert but confused. vss. no c/o pain noted. soft cast to right arm 
off but sling remains. patient repositioned for comfort. garcia catheter draining clear 
yellow urine to bsd. pm assessment complete. patient instructed to call for assistance when 
needed.

## 2018-12-13 NOTE — NUR
patient resting in bed, Alert with no distress, comfortable position in bed, family at bed 
side, garcia's catheter is intact

## 2018-12-13 NOTE — NUR
Visit made by the Spiritual Care Department Pastoral Visitor, Marco Cagle. PV provided 
pastoral presence, hospitality, and supportive listening. 

Pastoral Visitor informed pt/family of the scope of Chaplaincy Services and availability.



ERIN STUART



Spiritual Care Department

O: [phone]

Pager: [phone] ([phone])

## 2018-12-13 NOTE — DIAGNOSTIC IMAGING REPORT
Exam:  Right elbow, 3 views



History:  Right elbow fracture



Comparison: Humerus radiographs same day



Findings: Overlying cast material limits osseous detail. The bones are

diffusely osteopenic.



Small osseous fragment adjacent to the olecranon process likely represents a

small avulsion fracture. Elbow joint effusion is evident on the lateral

radiograph. No additional displaced fracture or dislocation.



Impression:



Small avulsion fracture of the olecranon process of the ulna with associated

traumatic joint effusion.





Signed by: Dr. Los Escamilla M.D. on 12/13/2018 11:13 AM

## 2018-12-13 NOTE — NUR
Dr Najera had rounds, new orders for x-ray recvd, assisted patient to use bedside commode, 
no distress noted

## 2018-12-14 VITALS — DIASTOLIC BLOOD PRESSURE: 68 MMHG | SYSTOLIC BLOOD PRESSURE: 150 MMHG

## 2018-12-14 VITALS — DIASTOLIC BLOOD PRESSURE: 68 MMHG | SYSTOLIC BLOOD PRESSURE: 148 MMHG

## 2018-12-14 VITALS — SYSTOLIC BLOOD PRESSURE: 159 MMHG | DIASTOLIC BLOOD PRESSURE: 68 MMHG

## 2018-12-14 VITALS — DIASTOLIC BLOOD PRESSURE: 66 MMHG | SYSTOLIC BLOOD PRESSURE: 146 MMHG

## 2018-12-14 VITALS — SYSTOLIC BLOOD PRESSURE: 162 MMHG | DIASTOLIC BLOOD PRESSURE: 72 MMHG

## 2018-12-14 VITALS — DIASTOLIC BLOOD PRESSURE: 63 MMHG | SYSTOLIC BLOOD PRESSURE: 137 MMHG

## 2018-12-14 RX ADMIN — ASPIRIN SCH MG: 81 TABLET, COATED ORAL at 21:00

## 2018-12-14 RX ADMIN — BICTEGRAVIR SODIUM, EMTRICITABINE, AND TENOFOVIR ALAFENAMIDE FUMARATE SCH: 50; 200; 25 TABLET ORAL at 09:00

## 2018-12-14 RX ADMIN — INSULIN LISPRO SCH UNIT: 100 INJECTION, SOLUTION INTRAVENOUS; SUBCUTANEOUS at 09:03

## 2018-12-14 RX ADMIN — INSULIN LISPRO SCH UNIT: 100 INJECTION, SOLUTION INTRAVENOUS; SUBCUTANEOUS at 17:16

## 2018-12-14 RX ADMIN — METOPROLOL SUCCINATE SCH MG: 25 TABLET, EXTENDED RELEASE ORAL at 21:00

## 2018-12-14 RX ADMIN — DULOXETINE HYDROCHLORIDE SCH MG: 30 CAPSULE, DELAYED RELEASE ORAL at 21:00

## 2018-12-14 RX ADMIN — LOSARTAN POTASSIUM SCH MG: 100 TABLET, FILM COATED ORAL at 09:03

## 2018-12-14 RX ADMIN — LIOTHYRONINE SODIUM SCH MCG: 5 TABLET ORAL at 09:04

## 2018-12-14 RX ADMIN — AMLODIPINE BESYLATE SCH MG: 5 TABLET ORAL at 09:04

## 2018-12-14 RX ADMIN — LEVOTHYROXINE SODIUM SCH MCG: 88 TABLET ORAL at 09:04

## 2018-12-14 RX ADMIN — INSULIN LISPRO SCH UNIT: 100 INJECTION, SOLUTION INTRAVENOUS; SUBCUTANEOUS at 12:10

## 2018-12-14 RX ADMIN — CEFTRIAXONE SCH MLS/HR: 100 INJECTION, POWDER, FOR SOLUTION INTRAVENOUS at 21:16

## 2018-12-14 RX ADMIN — Medication PRN MG: at 09:10

## 2018-12-14 RX ADMIN — INSULIN LISPRO SCH UNIT: 100 INJECTION, SOLUTION INTRAVENOUS; SUBCUTANEOUS at 21:00

## 2018-12-14 RX ADMIN — BICTEGRAVIR SODIUM, EMTRICITABINE, AND TENOFOVIR ALAFENAMIDE FUMARATE SCH: 50; 200; 25 TABLET ORAL at 17:00

## 2018-12-14 NOTE — NUR
Patient in bed AAOx2-3 with periods of confusion noted. IV to left FA leaking but restarted 
20G to same area x1 stick. Patient tolerated well. Replaced sling to patients right arm. 
Patient states my pain is ok. Informed patient to let me know when she needs pain meds. Pt 
verbalized understanding. Pt repositioned in bed and changed diaper. Bed alarm on. SR up x2. 
Bed to low level.

## 2018-12-14 NOTE — NUR
REPORT GIVEN TO ONCOMING NURSE. PATIENT IS RESTING IN BED. NO ACUTE DISTRESS NOTED. CALL 
LIGHT WITHIN REACH. BED IN THE LOWEST POSITION. BED ALARM ON.

## 2018-12-14 NOTE — NUR
RECEIVED PATIENT RESTING IN BED. NO ACUTE DISTRESS NOTED. CALL LIGHT WITHIN REACH. BED IN 
THE LOWEST POSITION. BED ALARM ON.

## 2018-12-15 VITALS — DIASTOLIC BLOOD PRESSURE: 60 MMHG | SYSTOLIC BLOOD PRESSURE: 135 MMHG

## 2018-12-15 VITALS — SYSTOLIC BLOOD PRESSURE: 148 MMHG | DIASTOLIC BLOOD PRESSURE: 67 MMHG

## 2018-12-15 VITALS — SYSTOLIC BLOOD PRESSURE: 142 MMHG | DIASTOLIC BLOOD PRESSURE: 95 MMHG

## 2018-12-15 VITALS — DIASTOLIC BLOOD PRESSURE: 63 MMHG | SYSTOLIC BLOOD PRESSURE: 133 MMHG

## 2018-12-15 VITALS — DIASTOLIC BLOOD PRESSURE: 65 MMHG | SYSTOLIC BLOOD PRESSURE: 142 MMHG

## 2018-12-15 VITALS — DIASTOLIC BLOOD PRESSURE: 95 MMHG | SYSTOLIC BLOOD PRESSURE: 142 MMHG

## 2018-12-15 VITALS — SYSTOLIC BLOOD PRESSURE: 155 MMHG | DIASTOLIC BLOOD PRESSURE: 69 MMHG

## 2018-12-15 RX ADMIN — METOPROLOL SUCCINATE SCH MG: 25 TABLET, EXTENDED RELEASE ORAL at 22:00

## 2018-12-15 RX ADMIN — INSULIN LISPRO SCH UNIT: 100 INJECTION, SOLUTION INTRAVENOUS; SUBCUTANEOUS at 12:20

## 2018-12-15 RX ADMIN — LEVOTHYROXINE SODIUM SCH MCG: 88 TABLET ORAL at 06:00

## 2018-12-15 RX ADMIN — BICTEGRAVIR SODIUM, EMTRICITABINE, AND TENOFOVIR ALAFENAMIDE FUMARATE SCH: 50; 200; 25 TABLET ORAL at 16:29

## 2018-12-15 RX ADMIN — INSULIN LISPRO SCH UNIT: 100 INJECTION, SOLUTION INTRAVENOUS; SUBCUTANEOUS at 21:00

## 2018-12-15 RX ADMIN — INSULIN LISPRO SCH UNIT: 100 INJECTION, SOLUTION INTRAVENOUS; SUBCUTANEOUS at 16:31

## 2018-12-15 RX ADMIN — AMLODIPINE BESYLATE SCH MG: 5 TABLET ORAL at 08:52

## 2018-12-15 RX ADMIN — DULOXETINE HYDROCHLORIDE SCH MG: 30 CAPSULE, DELAYED RELEASE ORAL at 22:00

## 2018-12-15 RX ADMIN — CEFTRIAXONE SCH MLS/HR: 100 INJECTION, POWDER, FOR SOLUTION INTRAVENOUS at 22:00

## 2018-12-15 RX ADMIN — BICTEGRAVIR SODIUM, EMTRICITABINE, AND TENOFOVIR ALAFENAMIDE FUMARATE SCH: 50; 200; 25 TABLET ORAL at 08:24

## 2018-12-15 RX ADMIN — INSULIN LISPRO SCH UNIT: 100 INJECTION, SOLUTION INTRAVENOUS; SUBCUTANEOUS at 07:30

## 2018-12-15 RX ADMIN — ASPIRIN SCH MG: 81 TABLET, COATED ORAL at 22:00

## 2018-12-15 RX ADMIN — LIOTHYRONINE SODIUM SCH MCG: 5 TABLET ORAL at 08:52

## 2018-12-15 RX ADMIN — LOSARTAN POTASSIUM SCH MG: 100 TABLET, FILM COATED ORAL at 08:52

## 2018-12-15 NOTE — NUR
Patient received pain meds and resting quitly in bed with no c/o at this time. Continue 
monitor.

-------------------------------------------------------------------------------

Addendum: 12/15/18 at 0617 by Lanie Munguia RN

-------------------------------------------------------------------------------

charted on wrong patient.

## 2018-12-15 NOTE — NUR
PATIENT FOUND WALKING WITH WALKER CLOSE TO HER ROOM DOOR BY PHYSICAL THERAPY. PATIENT 
ASSISTED BACK TO BED BY NURSE AND PCT, SHE WAS RE-ORIENTED. NURSE AND PCT ASSESSED BED ALARM 
DUE TO LIGHT AND SOUND NOT GOING OFF. BED CONNECTION TO PLUG WAS IN PLACE BUT THE CONNECTOR 
TO THE BED WAS DISCONNECTED. NURSE AND OTHER NURSE IN UNIT FIXED BED ALARM AND WORKING NOW. 
WILL CONTINUE TO CLOSELY MONITOR PATIENT.

## 2018-12-15 NOTE — PROGRESS NOTE
DATE:December 15, 2018



SUBJECTIVE:  Patient is here status post fall, urinary tract infection, and 

also history of diabetes mellitus.  Patient is currently doing well.  

Complains of some pain in the right lower extremity; otherwise, doing well. 

 Patient also complains of some weakness.



OBJECTIVE

VITAL SIGNS:  Temperature is 97.6, blood pressure is 142/95, pulse oximetry 

95% on room air.

HEENT:  Normocephalic, atraumatic.  Pupils are reactive to light and 

accommodation.

CVS:  S1 and S2 normal.  Regular rate and rhythm.

ABDOMEN:  Nontender, nondistended.

EXTREMITIES:  Right upper extremity in sling.  Does have an avulsion 

fracture.  Lower extremities; tenderness in the ankle and the knee joint.



LABORATORY DATA:  Laboratory values so far, glucose has been running in the 

110s.  Hematology; last white count was normal.  Hemoglobin and hematocrit 

was normal.  Coags have been normal too.  Urine does show few mucus and 

rbc's positive.



MICROBIOLOGY:  Urine culture so far has been negative.



MEDICATIONS:  Include levothyroxine for hypothyroidism, Rocephin q.24 hours 

for probable UTI, duloxetine for depression, metoprolol for blood pressure, 

liothyronine for her hypothyroidism, and amlodipine for hypertension.



PLAN:  We will continue to monitor the patient.  Physical therapy has been 

ordered and the patient is supposed to go to the SNF for ongoing therapy 

and also continue most of her medications.  Further recommendations per 

clinical course.  We will continue antibiotic and SNF eval has been done,







DD:  12/15/2018 08:20

DT:  12/15/2018 08:50

Job#:  [id number]

## 2018-12-15 NOTE — NUR
Patient was seen walking in the hallway on her own using the RW. PT assisted pt back to the 
bed and called RN and PCT to assist pt back to bed. Pt seemed more disoriented this 
afternoon vs earlier after lunch. Pt was able to get OOB on her own and WB on RUE when 
walking with a RW. Pt needs a sitter or be placed in a room close to the nurse station for 
efficient supervision.

## 2018-12-15 NOTE — NUR
REPORT GIVEN TO ONCOMING NURSE. PATIENT IS RESTING IN BED, NO ACUTE DISTRESS NOTED. CALL 
LIGHT WITHIN REACH. BED IN THE LOWEST POSITION. BED ALARM ON.

## 2018-12-15 NOTE — NUR
RECEIVED PATIENT RESTING IN BED. NO S/S OF DISTRESS NOTED. SON AT BEDSIDE. CALL LIGHT WITHIN 
REACH. BED IN THE LOWEST POSITION. BED ALARM ON.

## 2018-12-16 VITALS — SYSTOLIC BLOOD PRESSURE: 146 MMHG | DIASTOLIC BLOOD PRESSURE: 67 MMHG

## 2018-12-16 VITALS — DIASTOLIC BLOOD PRESSURE: 72 MMHG | SYSTOLIC BLOOD PRESSURE: 152 MMHG

## 2018-12-16 VITALS — DIASTOLIC BLOOD PRESSURE: 67 MMHG | SYSTOLIC BLOOD PRESSURE: 140 MMHG

## 2018-12-16 VITALS — SYSTOLIC BLOOD PRESSURE: 158 MMHG | DIASTOLIC BLOOD PRESSURE: 72 MMHG

## 2018-12-16 VITALS — SYSTOLIC BLOOD PRESSURE: 140 MMHG | DIASTOLIC BLOOD PRESSURE: 63 MMHG

## 2018-12-16 VITALS — SYSTOLIC BLOOD PRESSURE: 154 MMHG | DIASTOLIC BLOOD PRESSURE: 72 MMHG

## 2018-12-16 LAB
ANION GAP SERPL CALC-SCNC: 13.4 MMOL/L (ref 8–16)
BASOPHILS # BLD AUTO: 0 10*3/UL (ref 0–0.1)
BASOPHILS NFR BLD AUTO: 0.1 % (ref 0–1)
BUN SERPL-MCNC: 14 MG/DL (ref 7–26)
BUN/CREAT SERPL: 23 (ref 6–25)
CALCIUM SERPL-MCNC: 8.8 MG/DL (ref 8.4–10.2)
CHLORIDE SERPL-SCNC: 101 MMOL/L (ref 98–107)
CO2 SERPL-SCNC: 23 MMOL/L (ref 22–29)
DEPRECATED NEUTROPHILS # BLD AUTO: 5.1 10*3/UL (ref 2.1–6.9)
EGFRCR SERPLBLD CKD-EPI 2021: > 60 ML/MIN (ref 60–?)
EOSINOPHIL # BLD AUTO: 0.2 10*3/UL (ref 0–0.4)
EOSINOPHIL NFR BLD AUTO: 2 % (ref 0–6)
ERYTHROCYTE [DISTWIDTH] IN CORD BLOOD: 12.4 % (ref 11.7–14.4)
GLUCOSE SERPLBLD-MCNC: 150 MG/DL (ref 74–118)
HCT VFR BLD AUTO: 38.2 % (ref 34.2–44.1)
HGB BLD-MCNC: 12.9 G/DL (ref 12–16)
LYMPHOCYTES # BLD: 1.4 10*3/UL (ref 1–3.2)
LYMPHOCYTES NFR BLD AUTO: 18.2 % (ref 18–39.1)
MCH RBC QN AUTO: 30.4 PG (ref 28–32)
MCHC RBC AUTO-ENTMCNC: 33.8 G/DL (ref 31–35)
MCV RBC AUTO: 89.9 FL (ref 81–99)
MONOCYTES # BLD AUTO: 0.8 10*3/UL (ref 0.2–0.8)
MONOCYTES NFR BLD AUTO: 10.9 % (ref 4.4–11.3)
NEUTS SEG NFR BLD AUTO: 68.3 % (ref 38.7–80)
PLATELET # BLD AUTO: 280 X10E3/UL (ref 140–360)
POTASSIUM SERPL-SCNC: 3.4 MMOL/L (ref 3.5–5.1)
RBC # BLD AUTO: 4.25 X10E6/UL (ref 3.6–5.1)
SODIUM SERPL-SCNC: 134 MMOL/L (ref 136–145)

## 2018-12-16 RX ADMIN — METOPROLOL SUCCINATE SCH MG: 25 TABLET, EXTENDED RELEASE ORAL at 21:00

## 2018-12-16 RX ADMIN — DULOXETINE HYDROCHLORIDE SCH MG: 30 CAPSULE, DELAYED RELEASE ORAL at 21:00

## 2018-12-16 RX ADMIN — LIOTHYRONINE SODIUM SCH MCG: 5 TABLET ORAL at 10:12

## 2018-12-16 RX ADMIN — ASPIRIN SCH MG: 81 TABLET, COATED ORAL at 21:00

## 2018-12-16 RX ADMIN — LEVOTHYROXINE SODIUM SCH MCG: 88 TABLET ORAL at 06:00

## 2018-12-16 RX ADMIN — INSULIN LISPRO SCH UNIT: 100 INJECTION, SOLUTION INTRAVENOUS; SUBCUTANEOUS at 16:30

## 2018-12-16 RX ADMIN — CEFTRIAXONE SCH MLS/HR: 100 INJECTION, POWDER, FOR SOLUTION INTRAVENOUS at 22:14

## 2018-12-16 RX ADMIN — INSULIN LISPRO SCH UNIT: 100 INJECTION, SOLUTION INTRAVENOUS; SUBCUTANEOUS at 20:37

## 2018-12-16 RX ADMIN — BICTEGRAVIR SODIUM, EMTRICITABINE, AND TENOFOVIR ALAFENAMIDE FUMARATE SCH: 50; 200; 25 TABLET ORAL at 16:39

## 2018-12-16 RX ADMIN — AMLODIPINE BESYLATE SCH MG: 5 TABLET ORAL at 10:12

## 2018-12-16 RX ADMIN — LOSARTAN POTASSIUM SCH MG: 100 TABLET, FILM COATED ORAL at 10:12

## 2018-12-16 RX ADMIN — INSULIN LISPRO SCH UNIT: 100 INJECTION, SOLUTION INTRAVENOUS; SUBCUTANEOUS at 07:30

## 2018-12-16 RX ADMIN — Medication PRN MG: at 11:04

## 2018-12-16 RX ADMIN — INSULIN LISPRO SCH UNIT: 100 INJECTION, SOLUTION INTRAVENOUS; SUBCUTANEOUS at 12:03

## 2018-12-16 RX ADMIN — BICTEGRAVIR SODIUM, EMTRICITABINE, AND TENOFOVIR ALAFENAMIDE FUMARATE SCH: 50; 200; 25 TABLET ORAL at 09:00

## 2018-12-16 NOTE — PROGRESS NOTE
DATE:  December 16, 2018 



SUBJECTIVE:  Patient is here for syncope, dizziness, and fall.  Currently 

doing well.  Complains of some soreness because of the fall.  The patient 

also has urinary tract infection.  The patient does feel dizzy.



OBJECTIVE

VITAL SIGNS:  Temperature is 97.4, pulse of 74, respiration of 20, blood 

pressure is 115/72, and pulse oximetry 95%.

HEENT:  Normocephalic, atraumatic.  Pupils are reactive to light and 

accommodation.

NECK:  No JVD present.  The patient has carotid bruits on the right side, 

which is strong.

ABDOMEN:  Nontender, nondistended.

EXTREMITIES:  No clubbing, no cyanosis, and no edema.  Positive for 

tenderness in the areas that the patient fell on.



LABORATORY VALUES:  Chemistries; normal.  Hematology values from December 13, 2018 has been normal too.  Urine culture has been negative.



ASSESSMENT

1. Status post fall.

2. Hypertension.

3. Hyperlipidemia.

4. Carotid artery bruit.





PLAN:  Plan is to continue the Rocephin for 24 hours.  Patient for 

hypothyroidism will continue levothyroxine.  The patient is also on 

duloxetine for depression and metoprolol for blood pressure.  Carotid 

Doppler scan will be done.  Further recommendations per clinical course and 

she is scheduled for SNF evaluation and discharge to SNF, but before this 

we will do a carotid artery Doppler.







DD:  12/16/2018 08:21

DT:  12/16/2018 09:03

Job#:  [id number]

## 2018-12-16 NOTE — NUR
REPORT GIVEN TO ONCOMING NURSE, WALKING ROUNDS DONE. PATIENT IS RESTING IN BED. NO ACUTE 
DISTRESS NOTED. CALL LIGHT WITHIN REACH. BED IN THE LOWEST POSITION. BED ALARM ON.

## 2018-12-17 VITALS — SYSTOLIC BLOOD PRESSURE: 161 MMHG | DIASTOLIC BLOOD PRESSURE: 69 MMHG

## 2018-12-17 VITALS — DIASTOLIC BLOOD PRESSURE: 69 MMHG | SYSTOLIC BLOOD PRESSURE: 161 MMHG

## 2018-12-17 VITALS — DIASTOLIC BLOOD PRESSURE: 68 MMHG | SYSTOLIC BLOOD PRESSURE: 147 MMHG

## 2018-12-17 VITALS — SYSTOLIC BLOOD PRESSURE: 139 MMHG | DIASTOLIC BLOOD PRESSURE: 59 MMHG

## 2018-12-17 VITALS — DIASTOLIC BLOOD PRESSURE: 64 MMHG | SYSTOLIC BLOOD PRESSURE: 142 MMHG

## 2018-12-17 RX ADMIN — INSULIN LISPRO SCH UNIT: 100 INJECTION, SOLUTION INTRAVENOUS; SUBCUTANEOUS at 11:30

## 2018-12-17 RX ADMIN — INSULIN LISPRO SCH UNIT: 100 INJECTION, SOLUTION INTRAVENOUS; SUBCUTANEOUS at 07:30

## 2018-12-17 RX ADMIN — BICTEGRAVIR SODIUM, EMTRICITABINE, AND TENOFOVIR ALAFENAMIDE FUMARATE SCH: 50; 200; 25 TABLET ORAL at 09:00

## 2018-12-17 RX ADMIN — LEVOTHYROXINE SODIUM SCH MCG: 88 TABLET ORAL at 05:42

## 2018-12-17 RX ADMIN — LIOTHYRONINE SODIUM SCH MCG: 5 TABLET ORAL at 09:39

## 2018-12-17 RX ADMIN — AMLODIPINE BESYLATE SCH MG: 5 TABLET ORAL at 09:39

## 2018-12-17 RX ADMIN — BICTEGRAVIR SODIUM, EMTRICITABINE, AND TENOFOVIR ALAFENAMIDE FUMARATE SCH: 50; 200; 25 TABLET ORAL at 14:35

## 2018-12-17 RX ADMIN — LOSARTAN POTASSIUM SCH MG: 100 TABLET, FILM COATED ORAL at 09:39

## 2018-12-17 NOTE — NUR
Report given to Felicia at [facility]. Saline lock #20 inserted into left forearm as requested 
by Felicia. Pt notified of pending transfer. Emotional support given to pt. Awaiting pickup 
by EMS

## 2018-12-17 NOTE — NUR
Pt received resting in bed with right arm sling. Alert and oriented x2 with saline lock #22 
patent in left hand. Oriented to staff and surroundings. Emotional support given. Fall 
precautions maintained. Call bell within reach. All meds given as ordered. Will monitor

## 2018-12-17 NOTE — NUR
MIGUEL A MAXWELL FOR [facility] FILLED OUT AND SENT ALONG WITH CLINICALS. PT TO GO TO DR PERRY RM 413A GAVE RTF COMPLETED FILLED OUT TO NURSE
